# Patient Record
(demographics unavailable — no encounter records)

---

## 2025-05-30 NOTE — IMAGING
[de-identified] : Constitutional: Healthy, and well nourished in no acute distress. Psych: Calm, cooperative, grossly normal Eyes: Normal, sclera non-icteric Ears, Nose, Mouth, Throat: External inspection of nose and ears does not reveal any scars or masses Head: Normocephalic Neck: Neck appears supple without sign of limited or painful ROM Respiratory: Normal effort, no respiratory distress, no cyanosis Cardiovascular: Visualized extremities without edema or varicosities, warm, brisk cap refill Abdominal/GI: Not examined Skin: No rashes on the extremity examined.  Neurological: Patient is awake and alert   walking fine RIGHT ankle no overt effusion or ecchymosis  FROM of the ankle moderate tenderness to distal 1/3rd of the posteromedial tibialis tendon Mild tenderness distal achilles into superior calcaneus. pain reproduced with resisted inversion 4+/5 tolerated eversion 5/5 5/5 with resisted dorsi and plantarflexion no calf tenderness tolerated Homans- negative exam no  tenderness to posteromedial tibia into medial malleolus mild pes planus with standing arch reforms when seated.

## 2025-05-30 NOTE — HISTORY OF PRESENT ILLNESS
[de-identified] : The patient is a 16 year old female who presents today complaining of right ankle pain. here with her older brother. She is a year round . Date of Injury/Onset: 3 weeks ago - twisted it but has been playing through both in track and soccer.(midi) Pain:  At Rest: 7/10 With Activity:  8.5/10 Mechanism of injury: Patient was playing in a soccer game, took a fall and twisted her ankle  Quality of symptoms: Aching and sharp pain medial of her ankle. Reports she hears a sound form her right big toe when moving it  Improves with: Icing, rest  Worse with: Running, twisting ankle/ ROM  Prior treatment: Denies any prior treatment  Prior Imaging: None Additional Information: Currently in 10th grade- plays soccer and track. Has missed about 2 weeks of soccer but states she recently played through her tournament 05/24/25- 05/26/25 Accompanied by her brother   presently all pain is medial no  swelling  prior hx of stress injury on the other left ankle  Review of Systems:  Constitutional:  no fever, fatigue or recent weight loss  HEENT: negative  CV: negative  Pulm: negative  GI: negative  : negative  Neuro: negative  Skin: negative  Endocrine: negative  Heme: negative  MSK: See HPI.

## 2025-05-30 NOTE — DISCUSSION/SUMMARY
[de-identified] : The patient and their family member(s) were advised of the diagnosis. The natural history of the pathology was explained in full to the patient and the family in layman's terms.  right posterior tibialis tendonitis incompletely rehabb'ed ankle, overuse pes planus Here is the plan that we have set forth today. recommend holding soccer for 2 weeks while undergoing comprehensive PT ice daily motrin for pain arch supports, supportive sneakers over uggs Follow up in 2 weeks- if not improving may want to consider MRI to rule out BSI. The patient and the family understands the plan of care as described above.  All questions have been answered. Thank you for allowing me to care for CARSON. Sincerely, Liudmila Zhang, DO, FAAP, CAQ-[facility]